# Patient Record
Sex: MALE | Race: BLACK OR AFRICAN AMERICAN | URBAN - METROPOLITAN AREA
[De-identification: names, ages, dates, MRNs, and addresses within clinical notes are randomized per-mention and may not be internally consistent; named-entity substitution may affect disease eponyms.]

---

## 2020-02-14 ENCOUNTER — HOSPITAL ENCOUNTER (OUTPATIENT)
Dept: LAB | Age: 75
Discharge: HOME | End: 2020-02-14
Payer: MEDICARE

## 2020-02-14 PROCEDURE — 88305 TISSUE EXAM BY PATHOLOGIST: CPT

## 2021-03-29 PROBLEM — K63.5 COLON POLYP: Status: ACTIVE | Noted: 2021-03-29

## 2021-03-29 PROBLEM — K92.2 LOWER GASTROINTESTINAL BLEED: Status: ACTIVE | Noted: 2019-03-07

## 2021-03-29 PROBLEM — K59.09 CONSTIPATION, CHRONIC: Status: ACTIVE | Noted: 2021-03-29

## 2021-03-29 PROBLEM — K57.91 DIVERTICULOSIS OF INTESTINE WITH BLEEDING: Status: ACTIVE | Noted: 2019-03-07

## 2021-03-29 PROBLEM — K21.9 GASTROESOPHAGEAL REFLUX: Status: ACTIVE | Noted: 2021-03-29

## 2021-03-29 PROBLEM — J44.9 COPD (CHRONIC OBSTRUCTIVE PULMONARY DISEASE) (HCC): Status: ACTIVE | Noted: 2019-03-07

## 2021-03-29 PROBLEM — K64.9 HEMORRHOIDS: Status: ACTIVE | Noted: 2021-03-29

## 2021-03-29 PROBLEM — Z86.010 HISTORY OF COLONIC POLYPS: Status: ACTIVE | Noted: 2021-03-29

## 2021-03-29 PROBLEM — Z71.3 DIETARY COUNSELING AND SURVEILLANCE: Status: ACTIVE | Noted: 2021-03-29

## 2021-03-29 PROBLEM — K92.1 BLOOD IN STOOL: Status: ACTIVE | Noted: 2021-03-29

## 2021-08-03 PROBLEM — I10 HTN (HYPERTENSION): Status: RESOLVED | Noted: 2021-08-03 | Resolved: 2021-08-03

## 2022-02-16 PROBLEM — K57.30 DIVERTICULAR DISEASE OF COLON: Status: ACTIVE | Noted: 2019-04-18

## 2022-02-16 PROBLEM — I35.9 AORTIC VALVE DISEASE: Status: ACTIVE | Noted: 2022-02-16

## 2022-02-16 PROBLEM — R73.01 IMPAIRED FASTING GLUCOSE: Status: ACTIVE | Noted: 2022-02-16

## 2022-02-16 PROBLEM — I11.9 BENIGN HYPERTENSIVE HEART DISEASE WITHOUT CONGESTIVE HEART FAILURE: Status: ACTIVE | Noted: 2022-02-16

## 2022-02-16 PROBLEM — I51.7 CARDIOMEGALY: Status: ACTIVE | Noted: 2022-02-16

## 2022-02-16 PROBLEM — N18.9 CHRONIC RENAL FAILURE SYNDROME: Status: ACTIVE | Noted: 2019-07-27

## 2022-02-16 PROBLEM — E83.42 HYPOMAGNESEMIA: Status: ACTIVE | Noted: 2022-02-16

## 2022-03-18 PROBLEM — I35.9 AORTIC VALVE DISEASE: Status: ACTIVE | Noted: 2022-02-16

## 2022-03-18 PROBLEM — I51.7 CARDIOMEGALY: Status: ACTIVE | Noted: 2022-02-16

## 2022-03-18 PROBLEM — K92.1 BLOOD IN STOOL: Status: ACTIVE | Noted: 2021-03-29

## 2022-03-18 PROBLEM — Z71.3 DIETARY COUNSELING AND SURVEILLANCE: Status: ACTIVE | Noted: 2021-03-29

## 2022-03-18 PROBLEM — K57.91 DIVERTICULOSIS OF INTESTINE WITH BLEEDING: Status: ACTIVE | Noted: 2019-03-07

## 2022-03-18 PROBLEM — R73.01 IMPAIRED FASTING GLUCOSE: Status: ACTIVE | Noted: 2022-02-16

## 2022-03-18 PROBLEM — K57.30 DIVERTICULAR DISEASE OF COLON: Status: ACTIVE | Noted: 2019-04-18

## 2022-03-19 PROBLEM — K63.5 COLON POLYP: Status: ACTIVE | Noted: 2021-03-29

## 2022-03-19 PROBLEM — K21.9 GASTROESOPHAGEAL REFLUX: Status: ACTIVE | Noted: 2021-03-29

## 2022-03-19 PROBLEM — N18.9 CHRONIC RENAL FAILURE SYNDROME: Status: ACTIVE | Noted: 2019-07-27

## 2022-03-19 PROBLEM — K64.9 HEMORRHOIDS: Status: ACTIVE | Noted: 2021-03-29

## 2022-03-19 PROBLEM — I11.9 BENIGN HYPERTENSIVE HEART DISEASE WITHOUT CONGESTIVE HEART FAILURE: Status: ACTIVE | Noted: 2022-02-16

## 2022-03-19 PROBLEM — E83.42 HYPOMAGNESEMIA: Status: ACTIVE | Noted: 2022-02-16

## 2022-03-19 PROBLEM — Z86.010 HISTORY OF COLONIC POLYPS: Status: ACTIVE | Noted: 2021-03-29

## 2022-03-20 PROBLEM — K59.09 CONSTIPATION, CHRONIC: Status: ACTIVE | Noted: 2021-03-29

## 2022-03-20 PROBLEM — K92.2 LOWER GASTROINTESTINAL BLEED: Status: ACTIVE | Noted: 2019-03-07

## 2022-03-20 PROBLEM — J44.9 COPD (CHRONIC OBSTRUCTIVE PULMONARY DISEASE) (HCC): Status: ACTIVE | Noted: 2019-03-07

## 2022-10-27 PROBLEM — M10.00 IDIOPATHIC GOUT: Status: ACTIVE | Noted: 2022-10-27

## 2022-10-27 PROBLEM — M81.0 AGE RELATED OSTEOPOROSIS: Status: ACTIVE | Noted: 2022-10-27

## 2023-11-29 PROBLEM — N13.8 HYPERTROPHY OF PROSTATE WITH URINARY OBSTRUCTION AND OTHER LOWER URINARY TRACT SYMPTOMS (LUTS): Status: ACTIVE | Noted: 2023-11-29

## 2023-11-29 PROBLEM — N40.1 HYPERTROPHY OF PROSTATE WITH URINARY OBSTRUCTION AND OTHER LOWER URINARY TRACT SYMPTOMS (LUTS): Status: ACTIVE | Noted: 2023-11-29

## 2023-11-29 PROBLEM — I10 ESSENTIAL HYPERTENSION: Status: ACTIVE | Noted: 2019-03-07

## 2024-03-27 RX ORDER — ALENDRONATE SODIUM 70 MG/1
TABLET ORAL
Qty: 12 TABLET | Refills: 0 | Status: SHIPPED | OUTPATIENT
Start: 2024-03-27

## 2024-03-27 NOTE — TELEPHONE ENCOUNTER
Last Appointment:  Visit date not found  Future Appointments   Date Time Provider Department Center   4/9/2024 11:40 AM Vy Rios PA [facility] [place] Sched   4/29/2024  2:45 PM Darell Martini Sr., MD [facility] AMB   6/5/2024  2:15 PM Duy Anand MD [facility] [place] Sched   6/27/2024  1:00 PM Savannah Martinez MD [facility] AMB

## 2024-04-23 RX ORDER — OMEPRAZOLE 20 MG/1
20 CAPSULE, DELAYED RELEASE ORAL DAILY
Qty: 90 CAPSULE | Refills: 3 | Status: SHIPPED | OUTPATIENT
Start: 2024-04-23

## 2024-04-23 NOTE — TELEPHONE ENCOUNTER
PCP: Savannah Martinez MD    Last appt:  Visit date not found   Future Appointments   Date Time Provider Department Center   4/29/2024  2:45 PM Darell Martini Sr., MD [facility] AMB   5/3/2024  2:40 PM [facility]   6/5/2024  2:15 PM Duy Anand MD [facility] Sched   6/27/2024  1:00 PM Savannah Martinez MD [facility] AMB       Requested Prescriptions     Pending Prescriptions Disp Refills    omeprazole (PRILOSEC) 20 MG delayed release capsule 90 capsule 3     Sig: Take 1 capsule by mouth daily       Request for a 90 day supply? Provider Discretion    Pharmacy: Mail order    Other Comments:    PCP office will not fill at this time.

## 2024-04-29 ENCOUNTER — OFFICE VISIT (OUTPATIENT)
Age: 79
End: 2024-04-29
Payer: MEDICARE

## 2024-04-29 VITALS
TEMPERATURE: 97.7 F | BODY MASS INDEX: 34.57 KG/M2 | SYSTOLIC BLOOD PRESSURE: 144 MMHG | HEART RATE: 78 BPM | WEIGHT: 262 LBS | OXYGEN SATURATION: 97 % | DIASTOLIC BLOOD PRESSURE: 77 MMHG

## 2024-04-29 DIAGNOSIS — G47.33 OBSTRUCTIVE SLEEP APNEA: ICD-10-CM

## 2024-04-29 DIAGNOSIS — I10 PRIMARY HYPERTENSION: Primary | ICD-10-CM

## 2024-04-29 DIAGNOSIS — R94.31 ABNORMAL ECG: ICD-10-CM

## 2024-04-29 DIAGNOSIS — K21.9 GASTROESOPHAGEAL REFLUX DISEASE WITHOUT ESOPHAGITIS: ICD-10-CM

## 2024-04-29 DIAGNOSIS — I35.9 AORTIC VALVE DISEASE: ICD-10-CM

## 2024-04-29 DIAGNOSIS — R60.0 BILATERAL LEG EDEMA: ICD-10-CM

## 2024-04-29 DIAGNOSIS — I51.89 DIASTOLIC DYSFUNCTION: ICD-10-CM

## 2024-04-29 DIAGNOSIS — R01.1 SYSTOLIC MURMUR: ICD-10-CM

## 2024-04-29 DIAGNOSIS — I05.9 MITRAL VALVE DISEASE: ICD-10-CM

## 2024-04-29 PROCEDURE — 3077F SYST BP >= 140 MM HG: CPT | Performed by: INTERNAL MEDICINE

## 2024-04-29 PROCEDURE — 3078F DIAST BP <80 MM HG: CPT | Performed by: INTERNAL MEDICINE

## 2024-04-29 PROCEDURE — 99214 OFFICE O/P EST MOD 30 MIN: CPT | Performed by: INTERNAL MEDICINE

## 2024-04-29 PROCEDURE — 1123F ACP DISCUSS/DSCN MKR DOCD: CPT | Performed by: INTERNAL MEDICINE

## 2024-04-29 RX ORDER — FAMOTIDINE 20 MG/1
20 TABLET, FILM COATED ORAL 2 TIMES DAILY
Qty: 180 TABLET | Refills: 3 | Status: SHIPPED | OUTPATIENT
Start: 2024-04-29

## 2024-04-29 ASSESSMENT — ENCOUNTER SYMPTOMS
GASTROINTESTINAL NEGATIVE: 1
SHORTNESS OF BREATH: 0
EYES NEGATIVE: 1
ALLERGIC/IMMUNOLOGIC NEGATIVE: 1

## 2024-04-29 NOTE — PROGRESS NOTES
Identified pt with two pt identifiers(name and ). Reviewed record in preparation for visit and have obtained necessary documentation.    Darrin Benz presents today for   Chief Complaint   Patient presents with    Follow-up      1 year follow up       Pt c/o SOB, CHEST PAIN/ PRESSURE, HEADACHES.             Darrin Benz preferred language for health care discussion is english/other.    Personal Protective Equipment:   Personal Protective Equipment was used including: mask-surgical and hands-gloves. Patient was placed on no precaution(s). Patient was not masked.    Precautions:   Patient currently on None  Patient currently roomed with door closed.    Is someone accompanying this pt? no    Is the patient using any DME equipment during OV? no    Depression Screening:       No data to display                 Learning Assessment:  No question data found.    Abuse Screenin/29/2024     3:00 PM   AMB Abuse Screening   Do you ever feel afraid of your partner? N   Are you in a relationship with someone who physically or mentally threatens you? N   Is it safe for you to go home? Y          Fall Risk      2024     3:18 PM   Fall Risk   2 or more falls in past year? no   Fall with injury in past year? no         Pt currently taking Anticoagulant /Antiplatelet therapy? Aspirin 81    Coordination of Care:  1. Have you been to the ER, urgent care clinic since your last visit? Hospitalized since your last visit? no    2. Have you seen or consulted any other health care providers outside of the [facility] since your last visit? Include any pap smears or colon screening. no      Please see Red banners under Allergies and Med Rec to remove outside inquires. All correct information has been verified with patient and added to chart.     Medication's patient's would liked removed has been marked not taking to be removed per Verbal order and read back per Darell Martini MD

## 2024-04-29 NOTE — PROGRESS NOTES
Darrin Benz (:  1945) is a 78 y.o. male,Established patient, here for evaluation of the following chief complaint(s):  Follow-up ( 1 year follow up)    Subjective   SUBJECTIVE/OBJECTIVE:  HPI  Patient presents today for follow-up. He has a history of hypertension, cardiomegaly as well as mild kidney disease. He has prediabetes, obesity, and hypercholesterolemia.           Today, he is doing fair, but had noted some symptoms of blood in his urine with discomfort noted.  He went to emergency room on several occasions revealing evidence of what appears to be a urinary tract infection.  He is scheduled to see urology in the near future. No overt chest pain or shortness of breath. No palpitations, syncope, or near syncope. No orthopnea or paroxysmal nocturnal dyspnea. He will become somewhat short of breath if he goes up steps or walks fast for long distances such as greater than 1/2 mile, but otherwise stable. No history of known coronary disease or systolic heart failure.           I personally reviewed all available medical records, previous office notes, radiology reports and all available laboratory studies and procedural reports    Past Medical History:   Diagnosis Date    Anxiety     Arthropathy     Cardiac disease     Chronic bronchitis (HCC)     COPD (chronic obstructive pulmonary disease) (HCC)     Diverticulosis of colon 2019    Elevated prostate specific antigen (PSA)     Gout     HTN (hypertension)     Hypercholesterolemia     Hypertrophy of prostate with urinary obstruction and other lower urinary tract symptoms (LUTS)     Lower GI bleed 2019    Nocturia     ABNER (obstructive sleep apnea)     Vision decreased         Past Surgical History:   Procedure Laterality Date    BIOPSY PROSTATE  2010    PNBx-TRUS Vol 53.65 cc's, BPH w/PIN high grade - left mid, Dr Anand     COLONOSCOPY  2015        Allergies   Allergen Reactions    Castor Oil Other (See Comments)     Gi distress    Shellfish

## 2024-05-03 NOTE — TELEPHONE ENCOUNTER
Last Appointment:  Visit date not found  Future Appointments   Date Time Provider Department Center   5/3/2024  2:40 PM [facility] POD3 NURSE [facility] Sched   6/5/2024  2:15 PM Duy Anand MD [facility] Sched   6/27/2024 11:30 AM BS [facility] ECHO 1 [facility] AMB   6/27/2024  1:00 PM Savannah Martinez MD [facility] AMB   4/29/2025  2:30 PM Darell Martini Sr., MD [facility] AMB

## 2024-05-06 RX ORDER — ALLOPURINOL 100 MG/1
200 TABLET ORAL DAILY
Qty: 180 TABLET | Refills: 1 | Status: SHIPPED | OUTPATIENT
Start: 2024-05-06